# Patient Record
Sex: MALE | Race: WHITE | Employment: FULL TIME | URBAN - NONMETROPOLITAN AREA
[De-identification: names, ages, dates, MRNs, and addresses within clinical notes are randomized per-mention and may not be internally consistent; named-entity substitution may affect disease eponyms.]

---

## 2024-11-20 ENCOUNTER — OFFICE VISIT (OUTPATIENT)
Dept: SURGERY | Age: 49
End: 2024-11-20
Payer: COMMERCIAL

## 2024-11-20 VITALS
OXYGEN SATURATION: 97 % | BODY MASS INDEX: 24.18 KG/M2 | HEIGHT: 78 IN | WEIGHT: 209 LBS | SYSTOLIC BLOOD PRESSURE: 130 MMHG | HEART RATE: 98 BPM | TEMPERATURE: 98.9 F | DIASTOLIC BLOOD PRESSURE: 78 MMHG

## 2024-11-20 DIAGNOSIS — R63.4 UNINTENTIONAL WEIGHT LOSS: ICD-10-CM

## 2024-11-20 DIAGNOSIS — R19.7 DIARRHEA, UNSPECIFIED TYPE: Primary | ICD-10-CM

## 2024-11-20 PROCEDURE — 99214 OFFICE O/P EST MOD 30 MIN: CPT | Performed by: PHYSICIAN ASSISTANT

## 2024-11-20 NOTE — PROGRESS NOTES
caliber of stool: no  Change in color of stool: no  Previous work up date: colonoscopy in 30s      Past Medical History:   Diagnosis Date    Chronic bilateral low back pain without sciatica     DDD (degenerative disc disease), lumbar     Frequent diarrhea     Spondylosis of lumbar spine     Unintentional weight loss        Past Surgical History:   Procedure Laterality Date    FRACTURE SURGERY Right     hand surgery    SHOULDER ARTHROPLASTY Left 06/15/2022    Dr. Lauryn GAYLEDOM TOOTH EXTRACTION         No current outpatient medications on file.     No current facility-administered medications for this visit.       No Known Allergies    No family history on file.    Social History     Socioeconomic History    Marital status:      Spouse name: Not on file    Number of children: Not on file    Years of education: Not on file    Highest education level: Not on file   Occupational History    Not on file   Tobacco Use    Smoking status: Every Day    Smokeless tobacco: Never   Substance and Sexual Activity    Alcohol use: No    Drug use: No    Sexual activity: Not on file   Other Topics Concern    Not on file   Social History Narrative    Not on file     Social Determinants of Health     Financial Resource Strain: Not on file   Food Insecurity: Not on file   Transportation Needs: Not on file   Physical Activity: Not on file   Stress: Not on file   Social Connections: Not on file   Intimate Partner Violence: Not on file   Housing Stability: Not on file       ROS:   Cardio: denies Chest pain, denies palpitations  Respiratory: Denies shortness of breath, denies cough  Per hpi      Objective   Vitals:    11/20/24 1127   BP: 130/78   Pulse: 98   Temp: 98.9 °F (37.2 °C)   SpO2: 97%     General:NC/AT, cooperative, not lethargic, normal body habitus  HEENT: no lymphadenopathy of the neck appreciated  Lungs: Breathing without increased work, comfortable, no active coughing, symmetric chest rise, speaking in complete

## 2024-11-20 NOTE — ASSESSMENT & PLAN NOTE
48-year-old male with reported 40 pound unintentional weight loss over the past year to year and a half and diarrhea for 3 to 4 months, night sweats for the past month, no known family history of colon cancer, does have family history of some type of cancer he states, given the symptoms and that significant of weight loss and his age he is due for a colonoscopy screening which we can further evaluate this diarrhea and weight loss, recent lab work did not show anything on CMP, CBC, TSH, will order a CT with oral and IV contrast of the abdomen and pelvis to further rule out malignancy of solid organs and look for abnormalities within the GI tract.  Once we have results of that CT scan and colonoscopy we can formulate further plan or determine if we should perform upper scope as well.     1.  CT with IV and oral contrast of the abdomen pelvis  2.  Colonoscopy  3.  Further plan of care based on results from 1 and 2